# Patient Record
Sex: FEMALE | Race: WHITE | Employment: OTHER | URBAN - METROPOLITAN AREA
[De-identification: names, ages, dates, MRNs, and addresses within clinical notes are randomized per-mention and may not be internally consistent; named-entity substitution may affect disease eponyms.]

---

## 2017-08-20 ENCOUNTER — HOSPITAL ENCOUNTER (EMERGENCY)
Age: 42
Discharge: HOME | End: 2017-08-20
Attending: EMERGENCY MEDICINE
Payer: COMMERCIAL

## 2017-08-20 VITALS
TEMPERATURE: 99 F | OXYGEN SATURATION: 99 % | DIASTOLIC BLOOD PRESSURE: 94 MMHG | WEIGHT: 160 LBS | SYSTOLIC BLOOD PRESSURE: 149 MMHG | HEART RATE: 89 BPM | RESPIRATION RATE: 16 BRPM | BODY MASS INDEX: 30 KG/M2

## 2017-08-20 DIAGNOSIS — S00.03XA CONTUSION OF SCALP, INITIAL ENCOUNTER: Primary | ICD-10-CM

## 2017-08-20 PROCEDURE — 99283 EMERGENCY DEPT VISIT LOW MDM: CPT

## 2017-08-20 NOTE — ED NOTES
Called [facility] department spoke to officer over the phone, Pt does not wish to file a report.  Jetta Schirmer states they have been made aware and have it on file

## 2017-08-20 NOTE — ED PROVIDER NOTES
Patient Seen in: THE [facility] Emergency Department In [place]    History   Patient presents with:  Head Neck Injury (neurologic, musculoskeletal)    Stated Complaint: head inj    HPI  Patient is a 43-year-old female who states 3 AM early Saturday morning sh HPI.  Constitutional and vital signs reviewed. All other systems reviewed and negative except as noted above. PSFH elements reviewed from today and agreed except as otherwise stated in HPI.     Physical Exam   ED Triage Vitals [08/20/17 1511]  BP: 1 diagnosis)    Disposition:  Discharge    Follow-up:  Ishan Acuña, [address] [facility]  [address] [place] [address]  [phone]    In 2 days        Medications Prescribed:  Discharge Medication List as of 8/20/2017  3:37 PM

## 2017-08-20 NOTE — ED INITIAL ASSESSMENT (HPI)
States that her  hit her head, on a ceiling fan on Saturday morning. Denies loc. States that she has sensitivity to light.

## 2017-09-14 PROBLEM — N63.0 BREAST LUMP: Status: ACTIVE | Noted: 2017-09-14

## 2021-11-29 ENCOUNTER — APPOINTMENT (OUTPATIENT)
Dept: GENERAL RADIOLOGY | Age: 46
End: 2021-11-29
Attending: EMERGENCY MEDICINE
Payer: OTHER GOVERNMENT

## 2021-11-29 ENCOUNTER — HOSPITAL ENCOUNTER (EMERGENCY)
Age: 46
Discharge: HOME | End: 2021-11-29
Attending: EMERGENCY MEDICINE
Payer: OTHER GOVERNMENT

## 2021-11-29 VITALS
OXYGEN SATURATION: 95 % | HEIGHT: 61 IN | TEMPERATURE: 99 F | WEIGHT: 130 LBS | SYSTOLIC BLOOD PRESSURE: 138 MMHG | BODY MASS INDEX: 24.55 KG/M2 | DIASTOLIC BLOOD PRESSURE: 93 MMHG | RESPIRATION RATE: 22 BRPM | HEART RATE: 120 BPM

## 2021-11-29 DIAGNOSIS — J12.82 PNEUMONIA DUE TO COVID-19 VIRUS: Primary | ICD-10-CM

## 2021-11-29 DIAGNOSIS — U07.1 PNEUMONIA DUE TO COVID-19 VIRUS: Primary | ICD-10-CM

## 2021-11-29 PROCEDURE — 99283 EMERGENCY DEPT VISIT LOW MDM: CPT

## 2021-11-29 PROCEDURE — 71045 X-RAY EXAM CHEST 1 VIEW: CPT | Performed by: EMERGENCY MEDICINE

## 2021-11-29 PROCEDURE — 99284 EMERGENCY DEPT VISIT MOD MDM: CPT

## 2021-11-29 RX ORDER — BENZONATATE 100 MG/1
100 CAPSULE ORAL 3 TIMES DAILY PRN
Qty: 30 CAPSULE | Refills: 0 | Status: SHIPPED | OUTPATIENT
Start: 2021-11-29 | End: 2021-12-10 | Stop reason: CLARIF

## 2021-11-29 RX ORDER — ALBUTEROL SULFATE 90 UG/1
2 AEROSOL, METERED RESPIRATORY (INHALATION) EVERY 4 HOURS PRN
Qty: 1 EACH | Refills: 0 | Status: SHIPPED | OUTPATIENT
Start: 2021-11-29 | End: 2021-12-10 | Stop reason: CLARIF

## 2021-11-30 NOTE — ED INITIAL ASSESSMENT (HPI)
Pt states her children are positive for covid. States she tested negative. Pt reports cough, headache, fever, loss of taste and smell. Skin discoloration.

## 2021-11-30 NOTE — ED PROVIDER NOTES
Patient Seen in: [facility] In [facility]      History   Patient presents with:  Difficulty Breathing    Stated Complaint: cough    Subjective:   HPI    Patient presents with a cough and positive Covid exposure.   The patient's children a Notable for the following components:       Result Value    Rapid SARS-CoV-2 by PCR Detected (*)     All other components within normal limits        XR CHEST AP PORTABLE  (CPT=71045)    Result Date: 11/29/2021  PROCEDURE:  XR CHEST AP PORTABLE  (CPT=71054 is concern for gradual decline at home. As a result, a pulse oximetry device was given to the patient/caregiver and clear instructions relayed on how to use the device, interpret the results and when to return if worse.  The patient/caregiver verbalized und

## 2021-12-02 ENCOUNTER — APPOINTMENT (OUTPATIENT)
Dept: GENERAL RADIOLOGY | Facility: HOSPITAL | Age: 46
End: 2021-12-02
Attending: EMERGENCY MEDICINE
Payer: OTHER GOVERNMENT

## 2021-12-02 ENCOUNTER — HOSPITAL ENCOUNTER (EMERGENCY)
Facility: HOSPITAL | Age: 46
Discharge: HOME | End: 2021-12-02
Attending: EMERGENCY MEDICINE
Payer: OTHER GOVERNMENT

## 2021-12-02 ENCOUNTER — APPOINTMENT (OUTPATIENT)
Dept: CT IMAGING | Facility: HOSPITAL | Age: 46
End: 2021-12-02
Attending: EMERGENCY MEDICINE
Payer: OTHER GOVERNMENT

## 2021-12-02 VITALS
RESPIRATION RATE: 22 BRPM | SYSTOLIC BLOOD PRESSURE: 104 MMHG | TEMPERATURE: 97 F | HEART RATE: 106 BPM | HEIGHT: 61 IN | BODY MASS INDEX: 24.55 KG/M2 | WEIGHT: 130 LBS | OXYGEN SATURATION: 97 % | DIASTOLIC BLOOD PRESSURE: 66 MMHG

## 2021-12-02 DIAGNOSIS — U07.1 PNEUMONIA DUE TO COVID-19 VIRUS: Primary | ICD-10-CM

## 2021-12-02 DIAGNOSIS — J12.82 PNEUMONIA DUE TO COVID-19 VIRUS: Primary | ICD-10-CM

## 2021-12-02 PROCEDURE — 36415 COLL VENOUS BLD VENIPUNCTURE: CPT

## 2021-12-02 PROCEDURE — 99285 EMERGENCY DEPT VISIT HI MDM: CPT

## 2021-12-02 PROCEDURE — 85025 COMPLETE CBC W/AUTO DIFF WBC: CPT | Performed by: EMERGENCY MEDICINE

## 2021-12-02 PROCEDURE — 85379 FIBRIN DEGRADATION QUANT: CPT | Performed by: EMERGENCY MEDICINE

## 2021-12-02 PROCEDURE — 84145 PROCALCITONIN (PCT): CPT | Performed by: EMERGENCY MEDICINE

## 2021-12-02 PROCEDURE — 71045 X-RAY EXAM CHEST 1 VIEW: CPT | Performed by: EMERGENCY MEDICINE

## 2021-12-02 PROCEDURE — 71260 CT THORAX DX C+: CPT | Performed by: EMERGENCY MEDICINE

## 2021-12-02 PROCEDURE — 84703 CHORIONIC GONADOTROPIN ASSAY: CPT | Performed by: EMERGENCY MEDICINE

## 2021-12-02 PROCEDURE — 80053 COMPREHEN METABOLIC PANEL: CPT | Performed by: EMERGENCY MEDICINE

## 2021-12-02 PROCEDURE — 99284 EMERGENCY DEPT VISIT MOD MDM: CPT

## 2021-12-02 RX ORDER — GUAIFENESIN 600 MG
1200 TABLET, EXTENDED RELEASE 12 HR ORAL 2 TIMES DAILY
Qty: 28 TABLET | Refills: 0 | Status: SHIPPED | OUTPATIENT
Start: 2021-12-02 | End: 2021-12-09

## 2021-12-02 NOTE — ED INITIAL ASSESSMENT (HPI)
Pt presents to ED with complaint of worsening COVID symptoms. Pt reports being diagnosed with CPVID on 11/29/2021. Reports she is on medications for COVID pneumonia but nothing is helping.  Coughing fits

## 2021-12-03 NOTE — ED PROVIDER NOTES
Patient Seen in: [facility] Emergency Department      History   Patient presents with:  Covid    Stated Complaint: Pt sent by PCP for further evaluation after being diagnosed with COVID pneumonia    Subjective:   HPI    68-year-old woman, denies any HEENT:  Mucous membranes are moist.   Cardiovascular:  Normal rate and regular rhythm. No Edema  Pulmonary:  Pulmonary effort is normal.  Normal breath sounds. No wheezing, rhonchi or rales.    Skin: Warm and dry  Neurological: Awake alert, speech is nor since Monday,. FINDINGS:  Cardiac size and pulmonary vasculature are within normal limits. No pleural effusions. No pneumothorax. Bilateral pulmonary opacities, greatest within the lung bases.              CONCLUSION:  Bilateral pulmonary opacities, con used. Dose information is transmitted to the ACR ([facility] [employer]) [address]) which includes the Dose Index Registry.   PATIENT STATED HISTORY:(As transcribed by Technologist)  COUGH / SOB / COVID +   CONTRAST USED alternatives to receiving authorized Regen-COV, and informed that casirivimab and imdevimab are unapproved drugs that are authorized for use under this Emergency Use Authorization.  The patient has agreed and will receive the infusion and be monitored for 6

## 2021-12-10 ENCOUNTER — HOSPITAL ENCOUNTER (INPATIENT)
Facility: HOSPITAL | Age: 46
LOS: 1 days | Discharge: HOME | DRG: 177 | End: 2021-12-11
Attending: EMERGENCY MEDICINE | Admitting: INTERNAL MEDICINE
Payer: OTHER GOVERNMENT

## 2021-12-10 ENCOUNTER — HOSPITAL ENCOUNTER (OUTPATIENT)
Dept: CT IMAGING | Facility: HOSPITAL | Age: 46
Discharge: HOME | End: 2021-12-10
Attending: FAMILY MEDICINE
Payer: OTHER GOVERNMENT

## 2021-12-10 ENCOUNTER — APPOINTMENT (OUTPATIENT)
Dept: ULTRASOUND IMAGING | Facility: HOSPITAL | Age: 46
DRG: 177 | End: 2021-12-10
Attending: EMERGENCY MEDICINE
Payer: OTHER GOVERNMENT

## 2021-12-10 DIAGNOSIS — I82.402 DEEP VEIN THROMBOSIS (DVT) OF LEFT LOWER EXTREMITY, UNSPECIFIED CHRONICITY, UNSPECIFIED VEIN (HCC): ICD-10-CM

## 2021-12-10 DIAGNOSIS — U07.1 PNEUMONIA DUE TO COVID-19 VIRUS: ICD-10-CM

## 2021-12-10 DIAGNOSIS — I26.99 BILATERAL PULMONARY EMBOLISM (HCC): Primary | ICD-10-CM

## 2021-12-10 DIAGNOSIS — J12.82 PNEUMONIA DUE TO COVID-19 VIRUS: ICD-10-CM

## 2021-12-10 DIAGNOSIS — R00.0 TACHYCARDIA: ICD-10-CM

## 2021-12-10 DIAGNOSIS — R09.02 HYPOXIA: ICD-10-CM

## 2021-12-10 DIAGNOSIS — U07.1 COVID-19: ICD-10-CM

## 2021-12-10 PROCEDURE — 99285 EMERGENCY DEPT VISIT HI MDM: CPT

## 2021-12-10 PROCEDURE — 93005 ELECTROCARDIOGRAM TRACING: CPT

## 2021-12-10 PROCEDURE — 85610 PROTHROMBIN TIME: CPT | Performed by: EMERGENCY MEDICINE

## 2021-12-10 PROCEDURE — 93010 ELECTROCARDIOGRAM REPORT: CPT

## 2021-12-10 PROCEDURE — 93970 EXTREMITY STUDY: CPT | Performed by: EMERGENCY MEDICINE

## 2021-12-10 PROCEDURE — 84145 PROCALCITONIN (PCT): CPT | Performed by: INTERNAL MEDICINE

## 2021-12-10 PROCEDURE — 96365 THER/PROPH/DIAG IV INF INIT: CPT

## 2021-12-10 PROCEDURE — 85025 COMPLETE CBC W/AUTO DIFF WBC: CPT | Performed by: EMERGENCY MEDICINE

## 2021-12-10 PROCEDURE — 96361 HYDRATE IV INFUSION ADD-ON: CPT

## 2021-12-10 PROCEDURE — 80053 COMPREHEN METABOLIC PANEL: CPT | Performed by: EMERGENCY MEDICINE

## 2021-12-10 PROCEDURE — 83880 ASSAY OF NATRIURETIC PEPTIDE: CPT | Performed by: INTERNAL MEDICINE

## 2021-12-10 PROCEDURE — 82550 ASSAY OF CK (CPK): CPT | Performed by: INTERNAL MEDICINE

## 2021-12-10 PROCEDURE — 85730 THROMBOPLASTIN TIME PARTIAL: CPT | Performed by: EMERGENCY MEDICINE

## 2021-12-10 PROCEDURE — 71275 CT ANGIOGRAPHY CHEST: CPT | Performed by: FAMILY MEDICINE

## 2021-12-10 PROCEDURE — 87040 BLOOD CULTURE FOR BACTERIA: CPT | Performed by: INTERNAL MEDICINE

## 2021-12-10 RX ORDER — DEXTROSE AND SODIUM CHLORIDE 5; .45 G/100ML; G/100ML
INJECTION, SOLUTION INTRAVENOUS CONTINUOUS
Status: CANCELLED | OUTPATIENT
Start: 2021-12-10 | End: 2021-12-10

## 2021-12-10 RX ORDER — POLYETHYLENE GLYCOL 3350 17 G/17G
17 POWDER, FOR SOLUTION ORAL DAILY PRN
Status: DISCONTINUED | OUTPATIENT
Start: 2021-12-10 | End: 2021-12-11

## 2021-12-10 RX ORDER — ACETAMINOPHEN 325 MG/1
650 TABLET ORAL EVERY 6 HOURS PRN
Status: DISCONTINUED | OUTPATIENT
Start: 2021-12-10 | End: 2021-12-11

## 2021-12-10 RX ORDER — HEPARIN SODIUM 5000 [USP'U]/ML
80 INJECTION INTRAVENOUS; SUBCUTANEOUS ONCE
Status: COMPLETED | OUTPATIENT
Start: 2021-12-10 | End: 2021-12-10

## 2021-12-10 RX ORDER — BISACODYL 10 MG
10 SUPPOSITORY, RECTAL RECTAL
Status: DISCONTINUED | OUTPATIENT
Start: 2021-12-10 | End: 2021-12-11

## 2021-12-10 RX ORDER — HEPARIN SODIUM AND DEXTROSE 10000; 5 [USP'U]/100ML; G/100ML
18 INJECTION INTRAVENOUS ONCE
Status: COMPLETED | OUTPATIENT
Start: 2021-12-10 | End: 2021-12-10

## 2021-12-10 RX ORDER — SENNOSIDES 8.6 MG
17.2 TABLET ORAL NIGHTLY PRN
Status: DISCONTINUED | OUTPATIENT
Start: 2021-12-10 | End: 2021-12-11

## 2021-12-10 RX ORDER — SODIUM CHLORIDE 9 MG/ML
125 INJECTION, SOLUTION INTRAVENOUS CONTINUOUS
Status: DISCONTINUED | OUTPATIENT
Start: 2021-12-10 | End: 2021-12-10

## 2021-12-10 RX ORDER — ASPIRIN 81 MG/1
324 TABLET ORAL DAILY
COMMUNITY
End: 2021-12-11

## 2021-12-10 RX ORDER — RIBOFLAVIN (VITAMIN B2) 100 MG
100 TABLET ORAL DAILY
COMMUNITY

## 2021-12-10 RX ORDER — GUAIFENESIN 600 MG
600 TABLET, EXTENDED RELEASE 12 HR ORAL 2 TIMES DAILY
Status: DISCONTINUED | OUTPATIENT
Start: 2021-12-10 | End: 2021-12-11

## 2021-12-10 RX ORDER — HEPARIN SODIUM 5000 [USP'U]/ML
30 INJECTION, SOLUTION INTRAVENOUS; SUBCUTANEOUS ONCE
Status: COMPLETED | OUTPATIENT
Start: 2021-12-10 | End: 2021-12-10

## 2021-12-10 RX ORDER — SODIUM PHOSPHATE, DIBASIC AND SODIUM PHOSPHATE, MONOBASIC 7; 19 G/133ML; G/133ML
1 ENEMA RECTAL ONCE AS NEEDED
Status: DISCONTINUED | OUTPATIENT
Start: 2021-12-10 | End: 2021-12-11

## 2021-12-10 RX ORDER — AZITHROMYCIN 250 MG/1
250 TABLET, FILM COATED ORAL DAILY
COMMUNITY
Start: 2021-12-09 | End: 2021-12-11

## 2021-12-10 RX ORDER — ALBUTEROL SULFATE 90 UG/1
4 AEROSOL, METERED RESPIRATORY (INHALATION) EVERY 4 HOURS PRN
Status: DISCONTINUED | OUTPATIENT
Start: 2021-12-10 | End: 2021-12-11

## 2021-12-10 RX ORDER — IBUPROFEN 200 MG
800 TABLET ORAL EVERY 6 HOURS PRN
COMMUNITY
End: 2021-12-11

## 2021-12-10 RX ORDER — HEPARIN SODIUM AND DEXTROSE 10000; 5 [USP'U]/100ML; G/100ML
INJECTION INTRAVENOUS CONTINUOUS
Status: DISCONTINUED | OUTPATIENT
Start: 2021-12-10 | End: 2021-12-11

## 2021-12-10 RX ORDER — BENZONATATE 200 MG/1
200 CAPSULE ORAL 3 TIMES DAILY PRN
Status: DISCONTINUED | OUTPATIENT
Start: 2021-12-10 | End: 2021-12-11

## 2021-12-10 RX ORDER — CHOLECALCIFEROL (VITAMIN D3) 50 MCG
2000 TABLET ORAL DAILY
COMMUNITY
End: 2021-12-11

## 2021-12-10 NOTE — CONSULTS
INFECTIOUS DISEASE [address] Patient Status:  Emergency    6/10/1975 MRN [id number]   Location [address] [facility] Attending Skylar Gannon, [facility] Day # 0 PC on file prior to encounter. aspirin 81 MG Oral Tab EC, Take 324 mg by mouth daily. , Disp: , Rfl:   Ascorbic Acid (VITAMIN C) 100 MG Oral Tab, Take 100 mg by mouth daily. , Disp: , Rfl:   Cholecalciferol (VITAMIN D) 50 MCG (2000 UT) Oral Tab, Take 2,000 Uni the last 168 hours. Cardiac  No results for input(s): TROP, PBNP in the last 168 hours. Creatinine Kinase  No results for input(s): CK in the last 168 hours.     Inflammatory Markers  No results for input(s): CRP, KWAME, LDH, DDIMER in the last 168 hour

## 2021-12-10 NOTE — PROGRESS NOTES
Patient notified. Still at THE [facility] she will go to ED.

## 2021-12-10 NOTE — H&P
[facility]      Patient presents with:  Pulmonary Embolism (PE)  Covid       PCP: Guerline Morales DO      History of Present Illness: Patient is a 55year old female unvaccinated female covid + on 11/29.   Given mono clonal ab wit EOMs intact. Nose: Nares normal. Septum midline. Mucosa normal. No drainage.    Throat: Lips, mucosa, and tongue normal. Teeth and gums normal.   Neck: Supple, symmetrical, trachea midline, no cervical or supraclavicular lymph adenopathy, thyroid: no enl TECHNIQUE:  IV contrast-enhanced multislice CT angiography is performed through the pulmonary arterial anatomy. 3D volume renderings are generated. Dose reduction techniques were used.  Dose information is transmitted to the ACR ([address] used to evaluate the lower extremity venous system. B-mode two-dimensional images of the vascular structures, Doppler spectral analysis, and color flow. Doppler imaging were performed.   The following veins were imaged bilaterally:  Common, deep, and super by (CST): Sundar Alfred MD on 12/02/2021 at 3:56 PM       XR CHEST AP PORTABLE  (CPT=71045)    Result Date: 11/29/2021  PROCEDURE:  XR CHEST AP PORTABLE  (CPT=71045)  TECHNIQUE:  AP chest radiograph was obtained. COMPARISON:  None.   INDICATIONS:  cough the lungs but predominately within the periphery of the lungs and within the lower lobes. The large airways are unremarkable. VASCULATURE:  No visible pulmonary arterial thrombus or attenuation. LIBERTY:  No mass or adenopathy.   MEDIASTINUM:  No mass or ad

## 2021-12-10 NOTE — ED PROVIDER NOTES
Patient Seen in: [facility] Emergency Department      History   Patient presents with:  Pulmonary Embolism (PE)  Covid    Stated Complaint: Pt arrives from outpatient CT.  Per report, pt was in CT for a r/o pulmonary emb*    Subjective:   HPI    This Systems    Positive for stated complaint: Pt arrives from outpatient CT. Per report, pt was in CT for a r/o pulmonary emb*  Other systems are as noted in HPI. Constitutional and vital signs reviewed.       All other systems reviewed and negative except as Abnormality         Status                     ---------                               -----------         ------                     CBC W/ DIFFERENTIAL[[id number]]          Abnormal            Final result                 Please view (primary encounter diagnosis)  COVID-19  Deep vein thrombosis (DVT) of left lower extremity, unspecified chronicity, unspecified vein ([facility] [address]     Disposition:  Admit  12/10/2021  3:55 pm    Follow-up:  No follow-up provider specified.         Medications Pres

## 2021-12-10 NOTE — ED INITIAL ASSESSMENT (HPI)
Pt arrives to the ED from outpatient CT. Per report, pt was in CT for a r/o pulmonary embolism. CT results showed that pt is positive for PE. Pt is also COVID-19 POSITIVE. Pt arrives to the ED via wheelchair. Pt presents A&O and with a dry cough.

## 2021-12-10 NOTE — ED QUICK NOTES
Orders for admission, patient is aware of plan and ready to go upstairs. Any questions, please call ED RN TOSHA  at extension [phone]. Vaccinated? NO  Type of COVID test sent:RAPID  COVID Suspicion level:HIGH-POSITIVE      Titratable drug(s) infusing:HEPARI 9

## 2021-12-11 VITALS
WEIGHT: 130 LBS | SYSTOLIC BLOOD PRESSURE: 140 MMHG | TEMPERATURE: 99 F | DIASTOLIC BLOOD PRESSURE: 96 MMHG | OXYGEN SATURATION: 95 % | HEART RATE: 91 BPM | RESPIRATION RATE: 18 BRPM | BODY MASS INDEX: 24.55 KG/M2 | HEIGHT: 61 IN

## 2021-12-11 PROCEDURE — 82728 ASSAY OF FERRITIN: CPT | Performed by: INTERNAL MEDICINE

## 2021-12-11 PROCEDURE — 81003 URINALYSIS AUTO W/O SCOPE: CPT | Performed by: INTERNAL MEDICINE

## 2021-12-11 PROCEDURE — 83615 LACTATE (LD) (LDH) ENZYME: CPT | Performed by: INTERNAL MEDICINE

## 2021-12-11 PROCEDURE — 85025 COMPLETE CBC W/AUTO DIFF WBC: CPT | Performed by: INTERNAL MEDICINE

## 2021-12-11 PROCEDURE — 86140 C-REACTIVE PROTEIN: CPT | Performed by: INTERNAL MEDICINE

## 2021-12-11 PROCEDURE — 80053 COMPREHEN METABOLIC PANEL: CPT | Performed by: INTERNAL MEDICINE

## 2021-12-11 PROCEDURE — 85730 THROMBOPLASTIN TIME PARTIAL: CPT | Performed by: INTERNAL MEDICINE

## 2021-12-11 PROCEDURE — 85379 FIBRIN DEGRADATION QUANT: CPT | Performed by: INTERNAL MEDICINE

## 2021-12-11 RX ORDER — HEPARIN SODIUM 5000 [USP'U]/ML
30 INJECTION, SOLUTION INTRAVENOUS; SUBCUTANEOUS ONCE
Status: COMPLETED | OUTPATIENT
Start: 2021-12-11 | End: 2021-12-11

## 2021-12-11 NOTE — PROGRESS NOTES
12/11/21 1200   Mobility   O2 walk?  Yes   SPO2% on Room Air at Rest 95   At rest oxygen flow (liters per minute) 0   SPO2% Ambulation on Room Air 92   Ambulation oxygen flow (liters per minute) 0     Pt needs no supplemental oxygen on exertion

## 2021-12-11 NOTE — PROGRESS NOTES
NURSING DISCHARGE NOTE    PIV/tele removed. AVS reviewed w/ pt. Questions answered. Discharged Home via Wheelchair. Accompanied by Support staff  Belongings Taken by patient/family.

## 2021-12-11 NOTE — DISCHARGE SUMMARY
General Medicine Discharge Summary     Patient ID:  Vanessa Lemus  55year old  6/10/1975    Admit date: 12/10/2021    Discharge date and time: 12/11/2021    Attending Physician: Viviana Sawyer, Fever.    azithromycin 250 MG Oral Tab  Take 250 mg by mouth daily. predniSONE 20 MG Oral Tab  Take 2 tablets (40 mg total) by mouth daily for 5 days.           I PERSONALLY RECONCILED CURRENT AND DISCHARGE MEDICATIONS ON DAY OF DISCHARGE      Activity:

## 2021-12-11 NOTE — PROGRESS NOTES
12/10/21 2305   Provider Notification   Reason for Communication Review case   Provider Name Other (comment)  (Dr. Kostas Ladd)   Method of Communication Page   Response Waiting for response   Notification Time 2305   C/o of strong dry cough, no prn cough m

## 2021-12-11 NOTE — PLAN OF CARE
COVID-19 Daily Discharge Readiness-Nursing  No hypoxia noted.     Temperature max from last 24 hrs: Temp (24hrs), Av.1 °F (37.3 °C), Min:98.4 °F (36.9 °C), Max:99.9 °F (37.7 °C)    Inflammatory Markers:   Recent Labs   Lab 21  0615   CRP 3.16*   F

## 2021-12-11 NOTE — PROGRESS NOTES
NURSING ADMISSION NOTE      Patient admitted via Cart  Oriented to room. Safety precautions initiated. Bed in low position. Call light in reach. Admission Aaron completed. Pt resting comfortably in the bed call light with in reach.  Heparin gtt runnin

## 2021-12-11 NOTE — COVID NURSING ASSESSMENT
COVID-19 Daily Discharge Readiness-Nursing    O2 Sat at Rest:     RA  At 94%   O2 Sat with Exertion:    % on    liters   Temperature max from last 24 hrs: Temp (24hrs), Av.8 °F (37.1 °C), Min:98.2 °F (36.8 °C), Max:99.2 °F (37.3 °C)    Inflammatory Mar

## 2021-12-11 NOTE — PLAN OF CARE
Problem: Patient/Family Goals  Goal: Patient/Family Long Term Goal  Description: Patient's Long Term Goal: Discharge home with proper resources    Interventions:  - Follow plan of care  - See additional Care Plan goals for specific interventions  Outcome

## 2021-12-11 NOTE — PROGRESS NOTES
[facility]                INFECTIOUS DISEASE PROGRESS NOTE    Jasmine Atkinson Patient Status:  Inpatient    6/10/1975 MRN [id number]   [facility] 5NW-A Attending Ning Nava MD   Hosp Day # 1 PCP Emeka Hanks DO pulmonary embolus  -unvaccinated  Household exposure from children who were sick first     Onset of symptoms 11/26 or 11/27, fatigue, fever  -tested negative by ag on 11/27  Positive test at [place] ED on 11/29  Returned to ED on 12/2 and received MAB

## 2024-01-15 ENCOUNTER — APPOINTMENT (OUTPATIENT)
Dept: GENERAL RADIOLOGY | Age: 49
End: 2024-01-15
Attending: EMERGENCY MEDICINE
Payer: COMMERCIAL

## 2024-01-15 ENCOUNTER — HOSPITAL ENCOUNTER (OUTPATIENT)
Age: 49
Discharge: HOME | End: 2024-01-15
Attending: EMERGENCY MEDICINE
Payer: COMMERCIAL

## 2024-01-15 VITALS
RESPIRATION RATE: 18 BRPM | TEMPERATURE: 99 F | HEIGHT: 61 IN | WEIGHT: 175 LBS | HEART RATE: 114 BPM | BODY MASS INDEX: 33.04 KG/M2 | SYSTOLIC BLOOD PRESSURE: 141 MMHG | OXYGEN SATURATION: 98 % | DIASTOLIC BLOOD PRESSURE: 96 MMHG

## 2024-01-15 DIAGNOSIS — Z20.828 EXPOSURE TO RESPIRATORY SYNCYTIAL VIRUS (RSV): ICD-10-CM

## 2024-01-15 DIAGNOSIS — B34.9 VIRAL SYNDROME: Primary | ICD-10-CM

## 2024-01-15 PROCEDURE — 99213 OFFICE O/P EST LOW 20 MIN: CPT

## 2024-01-15 PROCEDURE — 99214 OFFICE O/P EST MOD 30 MIN: CPT

## 2024-01-15 PROCEDURE — 71046 X-RAY EXAM CHEST 2 VIEWS: CPT | Performed by: EMERGENCY MEDICINE

## 2024-01-15 RX ORDER — PREDNISONE 20 MG/1
40 TABLET ORAL DAILY
Qty: 10 TABLET | Refills: 0 | Status: SHIPPED | OUTPATIENT
Start: 2024-01-15 | End: 2024-01-20

## 2024-01-15 RX ORDER — ALBUTEROL SULFATE 90 UG/1
2 AEROSOL, METERED RESPIRATORY (INHALATION) EVERY 4 HOURS PRN
Qty: 1 EACH | Refills: 0 | Status: SHIPPED | OUTPATIENT
Start: 2024-01-15 | End: 2024-02-14

## 2024-01-15 RX ORDER — CODEINE PHOSPHATE/GUAIFENESIN 10-100MG/5
5 LIQUID (ML) ORAL 2 TIMES DAILY PRN
Qty: 118 ML | Refills: 0 | Status: SHIPPED | OUTPATIENT
Start: 2024-01-15 | End: 2024-01-29

## 2024-01-15 NOTE — ED PROVIDER NOTES
Patient Seen in: [facility]      History     Chief Complaint   Patient presents with    Cough/URI     Stated Complaint: Cough - Likely rsv but cough has worsened in the last day or so    Subjective:   48-year-old female presents with cough and wheezing, chills, daughter diagnosed with RSV yesterday.  Subjective cough.  No fever.  Patient with a history of COVID induced pulmonary emboli, formally on a NOAC, not currently.  No other history of cardiac or pulmonary disease.            Objective:   History reviewed. No pertinent past medical history.           Past Surgical History:   Procedure Laterality Date    ORAL SURGERY PROCEDURE  June, 1993    [place] Teeth Extraction                Social History     Socioeconomic History    Marital status:    Tobacco Use    Smoking status: Never    Smokeless tobacco: Never   Vaping Use    Vaping Use: Never used   Substance and Sexual Activity    Alcohol use: Yes     Alcohol/week: 2.0 standard drinks of alcohol     Types: 2 Glasses of wine per week     Comment: Rarely    Drug use: No              Review of Systems   Constitutional:  Positive for chills.   HENT:  Positive for congestion.    Respiratory:  Positive for cough and wheezing. Negative for shortness of breath.    Cardiovascular:  Negative for chest pain.   Gastrointestinal:  Negative for abdominal pain.   Neurological:  Negative for dizziness and headaches.       Positive for stated complaint: Cough - Likely rsv but cough has worsened in the last day or so  Other systems are as noted in HPI.  Constitutional and vital signs reviewed.      All other systems reviewed and negative except as noted above.    Physical Exam     ED Triage Vitals [01/15/24 1750]   BP (!) 141/96   Pulse 114   Resp 18   Temp 98.8 °F (37.1 °C)   Temp src Temporal   SpO2 98 %   O2 Device None (Room air)       Current:BP (!) 141/96   Pulse 114   Temp 98.8 °F (37.1 °C) (Temporal)   Resp 18   Ht 154.9 cm (5' 1\")   Wt 79.4  kg   LMP 12/17/2023 (Approximate)   SpO2 98%   BMI 33.07 kg/m²         Physical Exam  Vitals and nursing note reviewed.   Constitutional:       General: She is not in acute distress.     Appearance: She is not ill-appearing, toxic-appearing or diaphoretic.   HENT:      Head: Normocephalic.      Nose: Congestion present.      Mouth/Throat:      Pharynx: Oropharynx is clear. No oropharyngeal exudate or posterior oropharyngeal erythema.   Cardiovascular:      Rate and Rhythm: Normal rate and regular rhythm.      Pulses: Normal pulses.      Heart sounds: Normal heart sounds.   Pulmonary:      Effort: Pulmonary effort is normal. No respiratory distress.      Breath sounds: Normal breath sounds. No stridor. No wheezing, rhonchi or rales.   Musculoskeletal:      Cervical back: Neck supple.   Skin:     General: Skin is warm and dry.   Neurological:      General: No focal deficit present.      Mental Status: She is alert.      Cranial Nerves: No cranial nerve deficit.   Psychiatric:         Mood and Affect: Mood normal.         Behavior: Behavior normal.               ED Course   Labs Reviewed - No data to display                   MDM     XR CHEST PA + LAT CHEST (CPT=71046)    Result Date: 1/15/2024  CONCLUSION:  No focal consolidation.   LOCATION:  [facility]   Dictated by (CST): Minerva Watson MD on 1/15/2024 at 6:11 PM     Finalized by (CST): Minerva Watson MD on 1/15/2024 at 6:11 PM        40-year-old female with viral syndrome.  Cough with some slight wheezing.  No chest pain or shortness of breath.  Exposed to RSV.  Cannot do RSV testing here.  X-ray without pneumonia.  Not hypoxic or tachypneic.  Upon my assessment she had a normal heart rate.  Slightly tachycardic in triage.  Did discuss her history of PEs.  Will place her on prednisone and Mucinex with codeine as needed for cough as well as albuterol inhaler.  To the ER with worsening symptoms.  Return precaution provided.  Patient in agreement.  Shared decision making  utilized, discussed with benefits alternatives.  Discharge home at her request    Patient was screened and evaluated during this visit.  As the treating physician attending to the patient, I determined within reasonable clinical confidence and prior to discharge, that an emergency medical condition was not or was no longer present.  There was no indication for further evaluation, treatment, or admission on an emergency basis.  Comprehensive verbal and written discharge and follow-up instructions were provided to help prevent relapse or worsening.  Patient was instructed to follow-up with their primary care provider for further evaluation and treatment, return immediately to ER for worsening, concerning, new, or changing/persisting symptoms. I discussed the case with the patient and they had no questions, complaints, or concerns.  Patient was comfortable going home.     Per the discharge paperwork, patients are encouraged to and given instructions on how to sign up for MyChart, where they have access to their records, including any/all incidental findings.     This note was prepared using Dragon Medical voice recognition dictation software. As a result errors may occur. When identified these errors have been corrected. While every attempt is made to correct errors during dictation discrepancies may still exist    Note to patient: The 21st Century Cures Act makes medical notes like these available to patients in the interest of transparency. However, this is a medical document intended as peer to peer communication. It is written in medical language and may contain abbreviations or verbiage that are unfamiliar. It may appear blunt or direct. Medical documents are intended to carry relevant information, facts as evident, and the clinical opinion of the practitioner.                                      Medical Decision Making      Disposition and Plan     Clinical Impression:  1. Viral syndrome    2. Exposure to  respiratory syncytial virus (RSV)         Disposition:  Discharge  1/15/2024  6:17 pm    Follow-up:  Donna Coreas DO  [address] [place] [id number] [phone]                Medications Prescribed:  Current Discharge Medication List        START taking these medications    Details   predniSONE 20 MG Oral Tab Take 2 tablets (40 mg total) by mouth daily for 5 days.  Qty: 10 tablet, Refills: 0      albuterol 108 (90 Base) MCG/ACT Inhalation Aero Soln Inhale 2 puffs into the lungs every 4 (four) hours as needed for Wheezing.  Qty: 1 each, Refills: 0      guaiFENesin-Codeine 100-10 MG/5ML Oral Syrup Take 5 mL by mouth 2 (two) times daily as needed (cough).  Qty: 118 mL, Refills: 0    Associated Diagnoses: Viral syndrome

## 2024-04-13 ENCOUNTER — HOSPITAL ENCOUNTER (EMERGENCY)
Facility: HOSPITAL | Age: 49
Discharge: HOME | End: 2024-04-13
Attending: EMERGENCY MEDICINE
Payer: COMMERCIAL

## 2024-04-13 ENCOUNTER — APPOINTMENT (OUTPATIENT)
Dept: CT IMAGING | Facility: HOSPITAL | Age: 49
End: 2024-04-13
Attending: EMERGENCY MEDICINE
Payer: COMMERCIAL

## 2024-04-13 VITALS
WEIGHT: 170 LBS | BODY MASS INDEX: 32.1 KG/M2 | HEART RATE: 89 BPM | RESPIRATION RATE: 18 BRPM | HEIGHT: 61 IN | SYSTOLIC BLOOD PRESSURE: 171 MMHG | TEMPERATURE: 98 F | DIASTOLIC BLOOD PRESSURE: 102 MMHG | OXYGEN SATURATION: 99 %

## 2024-04-13 DIAGNOSIS — K04.7 DENTAL ABSCESS: Primary | ICD-10-CM

## 2024-04-13 LAB
ALBUMIN SERPL-MCNC: 3.7 G/DL (ref 3.4–5)
ALBUMIN/GLOB SERPL: 0.9 {RATIO} (ref 1–2)
ALP LIVER SERPL-CCNC: 65 U/L
ALT SERPL-CCNC: 19 U/L
ANION GAP SERPL CALC-SCNC: 5 MMOL/L (ref 0–18)
AST SERPL-CCNC: 11 U/L (ref 15–37)
BASOPHILS # BLD AUTO: 0.05 X10(3) UL (ref 0–0.2)
BASOPHILS NFR BLD AUTO: 0.5 %
BILIRUB SERPL-MCNC: 0.4 MG/DL (ref 0.1–2)
BUN BLD-MCNC: 9 MG/DL (ref 9–23)
CALCIUM BLD-MCNC: 9.5 MG/DL (ref 8.5–10.1)
CHLORIDE SERPL-SCNC: 109 MMOL/L (ref 98–112)
CO2 SERPL-SCNC: 25 MMOL/L (ref 21–32)
CREAT BLD-MCNC: 0.74 MG/DL
EGFRCR SERPLBLD CKD-EPI 2021: 100 ML/MIN/1.73M2 (ref 60–?)
EOSINOPHIL # BLD AUTO: 0.13 X10(3) UL (ref 0–0.7)
EOSINOPHIL NFR BLD AUTO: 1.4 %
ERYTHROCYTE [DISTWIDTH] IN BLOOD BY AUTOMATED COUNT: 13.8 %
GLOBULIN PLAS-MCNC: 4.2 G/DL (ref 2.8–4.4)
GLUCOSE BLD-MCNC: 96 MG/DL (ref 70–99)
HCT VFR BLD AUTO: 37.5 %
HGB BLD-MCNC: 12.6 G/DL
IMM GRANULOCYTES # BLD AUTO: 0.03 X10(3) UL (ref 0–1)
IMM GRANULOCYTES NFR BLD: 0.3 %
LYMPHOCYTES # BLD AUTO: 1.88 X10(3) UL (ref 1–4)
LYMPHOCYTES NFR BLD AUTO: 19.5 %
MCH RBC QN AUTO: 28.7 PG (ref 26–34)
MCHC RBC AUTO-ENTMCNC: 33.6 G/DL (ref 31–37)
MCV RBC AUTO: 85.4 FL
MONOCYTES # BLD AUTO: 0.77 X10(3) UL (ref 0.1–1)
MONOCYTES NFR BLD AUTO: 8 %
NEUTROPHILS # BLD AUTO: 6.76 X10 (3) UL (ref 1.5–7.7)
NEUTROPHILS # BLD AUTO: 6.76 X10(3) UL (ref 1.5–7.7)
NEUTROPHILS NFR BLD AUTO: 70.3 %
OSMOLALITY SERPL CALC.SUM OF ELEC: 287 MOSM/KG (ref 275–295)
PLATELET # BLD AUTO: 305 10(3)UL (ref 150–450)
POTASSIUM SERPL-SCNC: 3.8 MMOL/L (ref 3.5–5.1)
PROT SERPL-MCNC: 7.9 G/DL (ref 6.4–8.2)
RBC # BLD AUTO: 4.39 X10(6)UL
SODIUM SERPL-SCNC: 139 MMOL/L (ref 136–145)
WBC # BLD AUTO: 9.6 X10(3) UL (ref 4–11)

## 2024-04-13 PROCEDURE — 85025 COMPLETE CBC W/AUTO DIFF WBC: CPT

## 2024-04-13 PROCEDURE — 99284 EMERGENCY DEPT VISIT MOD MDM: CPT

## 2024-04-13 PROCEDURE — 80053 COMPREHEN METABOLIC PANEL: CPT

## 2024-04-13 PROCEDURE — 70491 CT SOFT TISSUE NECK W/DYE: CPT | Performed by: EMERGENCY MEDICINE

## 2024-04-13 PROCEDURE — 80053 COMPREHEN METABOLIC PANEL: CPT | Performed by: EMERGENCY MEDICINE

## 2024-04-13 PROCEDURE — 36415 COLL VENOUS BLD VENIPUNCTURE: CPT

## 2024-04-13 PROCEDURE — 85025 COMPLETE CBC W/AUTO DIFF WBC: CPT | Performed by: EMERGENCY MEDICINE

## 2024-04-13 RX ORDER — CLINDAMYCIN HYDROCHLORIDE 300 MG/1
CAPSULE ORAL 3 TIMES DAILY
COMMUNITY

## 2024-04-13 RX ORDER — HYDROCODONE BITARTRATE AND ACETAMINOPHEN 5; 325 MG/1; MG/1
1-2 TABLET ORAL EVERY 4 HOURS PRN
Qty: 20 TABLET | Refills: 0 | Status: SHIPPED | OUTPATIENT
Start: 2024-04-13

## 2024-04-13 RX ORDER — IBUPROFEN 600 MG/1
600 TABLET ORAL ONCE
Status: COMPLETED | OUTPATIENT
Start: 2024-04-13 | End: 2024-04-13

## 2024-04-13 NOTE — ED INITIAL ASSESSMENT (HPI)
Abscess to right lower side of face, redness and swelling has increased. Pt already on clindimycin. Pt states that it started on Monday as a sore spot and then abscess got bigger and had cellulitis. Denies any fevers. Denies any difficulty swallowing no difficulty breathing.

## 2024-04-14 NOTE — DISCHARGE INSTRUCTIONS
Elevated.  Continue your clindamycin.  Do not eat after midnight on Sunday.  Go to \"s office on Monday morning.  You are advised to call at 8 AM right when they open up to confirm time in the morning.

## 2024-04-14 NOTE — ED QUICK NOTES
Rounding Completed    Plan of Care reviewed. Waiting for CT scan.  Elimination needs assessed.  Provided updates.    Bed is locked and in lowest position. Call light within reach.

## 2024-04-14 NOTE — ED PROVIDER NOTES
Patient Seen in: [facility] Emergency Department      History     Chief Complaint   Patient presents with    Abscess     Stated Complaint: Abscess to face, gettting worse. Size of a golfball.    Subjective:   HPI    48-year-old female presents to the emergency department secondary to a tooth abscess.  Patient has been seen by a dentist and was told that she had an abscess of the base of one of her teeth and was initiated on clindamycin and she did have a component of facial cellulitis but she states that increased in size and therefore she came to the ER.  No fevers.  No nausea or vomiting.  No difficulty swallowing.  No history of being diabetic.  No other acute complaints    Objective:   History reviewed. No pertinent past medical history.           Past Surgical History:   Procedure Laterality Date    Oral surgery procedure  June, 1993    [place] Teeth Extraction                Social History     Socioeconomic History    Marital status: Life Partner   Tobacco Use    Smoking status: Never    Smokeless tobacco: Never   Vaping Use    Vaping status: Never Used   Substance and Sexual Activity    Alcohol use: Yes     Alcohol/week: 2.0 standard drinks of alcohol     Types: 2 Glasses of wine per week     Comment: Rarely    Drug use: No              Review of Systems   All other systems reviewed and are negative.      Positive for stated complaint: Abscess to face, gettting worse. Size of a golfball.  Other systems are as noted in HPI.  Constitutional and vital signs reviewed.      All other systems reviewed and negative except as noted above.    Physical Exam     ED Triage Vitals [04/13/24 1758]   BP (!) 156/95   Pulse 102   Resp 18   Temp 98.4 °F (36.9 °C)   Temp src Temporal   SpO2 97 %   O2 Device None (Room air)       Current:BP (!) 171/102   Pulse 89   Temp 98.4 °F (36.9 °C) (Temporal)   Resp 18   Ht 154.9 cm (5' 1\")   Wt 77.1 kg   LMP 03/20/2024 (Approximate)   SpO2 99%   BMI 32.12 kg/m²          Physical Exam  Vitals and nursing note reviewed.   Constitutional:       Appearance: Normal appearance.   HENT:      Head: Atraumatic.        Comments: Area of approximately 3 cm plus fluctuant abscess with no active drainage     Mouth/Throat:      Mouth: Mucous membranes are moist.   Eyes:      Extraocular Movements: Extraocular movements intact.      Pupils: Pupils are equal, round, and reactive to light.   Cardiovascular:      Rate and Rhythm: Normal rate and regular rhythm.      Pulses: Normal pulses.      Heart sounds: Normal heart sounds.   Musculoskeletal:         General: Normal range of motion.      Cervical back: Normal range of motion and neck supple.   Lymphadenopathy:      Cervical: No cervical adenopathy.   Skin:     General: Skin is warm.   Neurological:      General: No focal deficit present.      Mental Status: She is alert and oriented to person, place, and time.              ED Course     Labs Reviewed   COMP METABOLIC PANEL (14) - Abnormal; Notable for the following components:       Result Value    AST 11 (*)     A/G Ratio 0.9 (*)     All other components within normal limits   CBC WITH DIFFERENTIAL WITH PLATELET    Narrative:     The following orders were created for panel order CBC With Differential With Platelet.  Procedure                               Abnormality         Status                     ---------                               -----------         ------                     CBC W/ DIFFERENTIAL[[id number]]                              Final result                 Please view results for these tests on the individual orders.   RAINBOW DRAW LAVENDER   RAINBOW DRAW LIGHT GREEN   RAINBOW DRAW BLUE   CBC W/ DIFFERENTIAL             CT SOFT TISSUE OF NECK(CONTRAST ONLY) (CPT=70491)    Result Date: 4/13/2024  PROCEDURE:  CT SOFT TISSUE OF NECK(CONTRAST ONLY) (CPT=70491)  COMPARISON:  None.  INDICATIONS:  Abscess to face, gettting worse. Size of a golfball.  Attention mandible!!!!!!!   TECHNIQUE:  IV contrast-enhanced multislice CT scanning is performed through the neck soft tissues during administration of nonionic contrast.  Dose reduction techniques were used. Dose information is transmitted to the ACR (American College of Radiology) NRDR (National Radiology Data Registry) which includes the Dose Index Registry.  PATIENT STATED HISTORY:(As transcribed by Technologist)  Large mass to her left jaw and face, getting worse.   CONTRAST USED:  60cc of Isovue 370  FINDINGS: NASOPHARYNX:  Fossae of Rosenmuller and torus tubarius are symmetric.  ORAL CAVITY:  No visible mass.  OROPHARYNX:  Faucial and lingual tonsils are symmetric.  HYPOPHARYNX:  No mass or other visible lesion.  LARYNX:  The vocal cords are symmetric and without mass.  SINUSES:  Limited views show no significant fluid or mucosal thickening.  NECK GLANDS:  The parotid, submandibular, and thyroid glands are unremarkable.  LYMPH NODES:  Few mildly enlarged right submandibular lymph nodes, likely reactive. SKULL BASE:  Foramina are symmetric without bony erosion.  VASCULATURE:  Limited views are unremarkable.  BONES:  No significant osseous lesions.  OTHER:  There is a thick-walled, rim enhancing fluid collection within the right perimandibular soft tissues measuring approximately 2.8 x 2.5 x 2.6 cm, highly suspicious for soft tissue abscess.  There is extensive inflammation of the surrounding subcutaneous tissues.  This inflammation extends to the right anterior mandible.  Note made of periapical lucency about the right 1st mandibular molar (series 3, image 45, series 6, image 40), possibly infectious/inflammatory.  This lucency results in marked thinning and possible breach of the anterior mandibular cortex (series 3, image 46).  This lies immediately adjacent to the perimandibular inflammation and may potentially represent a source.            CONCLUSION:   1. Thick-walled, rim enhancing fluid collection within the right perimandibular  soft tissues, 2.8 x 2.5 x 2.6 cm, highly suspicious for soft tissue abscess.  2. Extensive right perimandibular inflammation extending to the mandibular cortex.  Periapical lucency noted about the right 1st mandibular molar resulting in marked thinning and possible breach of the mandibular cortex.  This lies immediately adjacent to the perimandibular inflammation and may represent the source.  Correlate with evidence of dental disease.   LOCATION:  [facility]    Dictated by (CST): Sarah Gaitan MD on 4/13/2024 at 8:53 PM     Finalized by (CST): Sarah Gaitan MD on 4/13/2024 at 9:00 PM              MDM      Initially patient had local infiltration with lidocaine with epinephrine on the mucosal surface and tends to aspirate purulent material.  There was no purulent material.  Does appear that she has this large abscess it is unclear if it is just a facial abscess or was potentially a tooth abscess with extension.  Subsequently she underwent a CT scan with IV contrast.  And it does appear that it does extend from her tooth.  Subsequently I was hesitant to do any external drainage did not form a fistula.  I did attempt again for internal/mucosal surface drainage without success and did have one of my partners attempt as well.  Subsequently I contacted oral surgery and they graciously agreed to see her on Monday morning.  I reviewed this with her and she did begin to have some spontaneous drainage externally.  I advised that she do warm compresses but not do anything to extend the opening otherwise.  She will be given medications for pain and is to continue her clindamycin.  Patient was discharged in good condition                                   Medical Decision Making      Disposition and Plan     Clinical Impression:  1. Dental abscess         Disposition:  Discharge  4/13/2024 10:12 pm    Follow-up:  Madyson Coreas, DMD  [address] [facility] [address]  [phone]    Schedule an appointment as soon as  possible for a visit            Medications Prescribed:  Discharge Medication List as of 4/13/2024 10:18 PM        START taking these medications    Details   HYDROcodone-acetaminophen 5-325 MG Oral Tab Take 1-2 tablets by mouth every 4 (four) hours as needed for Pain., Normal, Disp-20 tablet, R-0

## 2024-04-15 ENCOUNTER — LAB REQUISITION (OUTPATIENT)
Dept: LAB | Facility: HOSPITAL | Age: 49
End: 2024-04-15
Payer: COMMERCIAL

## 2024-04-15 DIAGNOSIS — Z00.00 ENCOUNTER FOR GENERAL ADULT MEDICAL EXAMINATION WITHOUT ABNORMAL FINDINGS: ICD-10-CM

## 2024-04-15 PROCEDURE — 87076 CULTURE ANAEROBE IDENT EACH: CPT | Performed by: DENTIST

## 2024-04-15 PROCEDURE — 87070 CULTURE OTHR SPECIMN AEROBIC: CPT | Performed by: DENTIST

## 2024-04-15 PROCEDURE — 87205 SMEAR GRAM STAIN: CPT | Performed by: DENTIST

## 2024-04-15 PROCEDURE — 87077 CULTURE AEROBIC IDENTIFY: CPT | Performed by: DENTIST

## 2024-04-15 PROCEDURE — 87075 CULTR BACTERIA EXCEPT BLOOD: CPT | Performed by: DENTIST

## (undated) NOTE — ED AVS SNAPSHOT
Mikel Hem   MRN: [id number]    Department:  THE [facility] Emergency Department in [place]   Date of Visit:  8/20/2017           Disclosure     Insurance plans vary and the physician(s) referred by the ER may not be covered by your plan.  Please conta If you have been prescribed any medication(s), please fill your prescription right away and begin taking the medication(s) as directed    If the emergency physician has read X-rays, these will be re-interpreted by a radiologist.  If there is a significant